# Patient Record
Sex: FEMALE | Race: WHITE | NOT HISPANIC OR LATINO
[De-identification: names, ages, dates, MRNs, and addresses within clinical notes are randomized per-mention and may not be internally consistent; named-entity substitution may affect disease eponyms.]

---

## 2018-10-31 PROBLEM — Z00.00 ENCOUNTER FOR PREVENTIVE HEALTH EXAMINATION: Status: ACTIVE | Noted: 2018-10-31

## 2019-02-09 ENCOUNTER — RX CHANGE (OUTPATIENT)
Age: 64
End: 2019-02-09

## 2019-02-27 ENCOUNTER — RECORD ABSTRACTING (OUTPATIENT)
Age: 64
End: 2019-02-27

## 2019-02-27 DIAGNOSIS — Z87.19 PERSONAL HISTORY OF OTHER DISEASES OF THE DIGESTIVE SYSTEM: ICD-10-CM

## 2019-02-27 DIAGNOSIS — Z82.0 FAMILY HISTORY OF EPILEPSY AND OTHER DISEASES OF THE NERVOUS SYSTEM: ICD-10-CM

## 2019-02-27 DIAGNOSIS — Z82.3 FAMILY HISTORY OF STROKE: ICD-10-CM

## 2019-02-27 DIAGNOSIS — Z82.49 FAMILY HISTORY OF ISCHEMIC HEART DISEASE AND OTHER DISEASES OF THE CIRCULATORY SYSTEM: ICD-10-CM

## 2019-02-27 DIAGNOSIS — Z78.9 OTHER SPECIFIED HEALTH STATUS: ICD-10-CM

## 2019-02-27 RX ORDER — LANSOPRAZOLE 15 MG/1
15 CAPSULE, DELAYED RELEASE ORAL
Refills: 0 | Status: ACTIVE | COMMUNITY

## 2019-02-27 RX ORDER — MULTIVITAMIN
CAPSULE ORAL
Refills: 0 | Status: ACTIVE | COMMUNITY

## 2019-03-15 ENCOUNTER — RESULT REVIEW (OUTPATIENT)
Age: 64
End: 2019-03-15

## 2019-03-19 ENCOUNTER — APPOINTMENT (OUTPATIENT)
Dept: ENDOCRINOLOGY | Facility: CLINIC | Age: 64
End: 2019-03-19
Payer: COMMERCIAL

## 2019-03-19 VITALS
SYSTOLIC BLOOD PRESSURE: 120 MMHG | DIASTOLIC BLOOD PRESSURE: 78 MMHG | HEART RATE: 66 BPM | HEIGHT: 68 IN | WEIGHT: 161 LBS | BODY MASS INDEX: 24.4 KG/M2

## 2019-03-19 PROCEDURE — 99213 OFFICE O/P EST LOW 20 MIN: CPT

## 2019-03-19 NOTE — PHYSICAL EXAM
[Alert] : alert [No Acute Distress] : no acute distress [Well Nourished] : well nourished [Well Developed] : well developed [EOMI] : extra ocular movement intact [Normal Outer Ear/Nose] : the ears and nose were normal in appearance [No Neck Mass] : no neck mass was observed [Supple] : the neck was supple [Thyroid Not Enlarged] : the thyroid was not enlarged [No Stigmata of Cushings Syndrome] : no stigmata of cushings syndrome

## 2019-03-19 NOTE — HISTORY OF PRESENT ILLNESS
[FreeTextEntry1] : March 19, 2019..........................ba\par \par PCP: Dr. Milagro Younger\par             Neuro: Dr. Rosemary Lopez - shooting leg pain - resolved\par             Ortho Dr Koch for the knee pain - \par             better on gluten free diet\par             Card: Dr. Nisha Tong - palpitations/FHx ASHD\par             Rheum: Dr. Karla Wong - knee pain - emperic Rx of arthritis\par             Surgery: Dr. Fredy Maher\par             ENT: to see for nasal polyp\par            .\par            CC: Thyroid cancer, R lobe ~1 cm - 2/23/15 - Dr. Mhaer \par             " Encapsulated papillary thyroid carcinoma, classical type with oncocytic features, non-invasive (1 cm in greatest size per outside report). 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     Has friend at [facility].\par Went to [facility] for thyroglobulin, but Tg Ab and TPO ab performed.  \par US says new nodules and repeat.\par Will repeat US end of August-early Sept and ROV after that.    Tg now and before next visit at lab she previously visited.  \par \par Previous notes from [employer] appended below.\par \par \par  March 26, 2018\par            .\par            PCP: Dr. Milagro Younger\par             Neuro: Dr. Rosemary Lopez - shooting leg pain - resolved\par             Ortho Dr Koch for the knee pain - \par             better on gluten free diet\par             Card: Dr. Nisha Tong - palpitations/FHx ASHD\par             Rheum: Dr. Karla Wong - knee pain - emperic Rx of arthritis\par             Surgery: Dr. Fredy Maher\par             ENT: to see for nasal polyp\par            .\par            CC: Thyroid cancer, R lobe ~1 cm - 2/23/15 - Dr. Maher \par             " Encapsulated papillary thyroid carcinoma, classical type with oncocytic features, non-invasive (1 cm in greatest size per outside report). 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, found to have thyroid nodule during carotid ultrasound - currently on on wheat free diet per her\par            brother (GP) - with success.\par            .\par            Last year in February had ultrasound of neck.\par            .\par            At [facility], on \par            3/28/2017 labs included]\par            TPO ab neg\par            Tg 9.2 *\par            TSH 1.46\par            \par            HDL 62 \par            .\par            [id number]\par            Tg 10.9\par            Tg Ab neg \par            TSH 1.16 \par            .\par            Updated ultrasound of thyroid at [facility]:\par            .\par            "Exam Date: 03/14/18 \par            Exam: US THYROID \par            Order#: [id number] \par            CLINICAL HISTORY: Papillary carcinoma. Follow-up. \par             Thyroid Ultrasound \par             Real-time sonographic examination was performed on 3/14/2018 and compared to a previous study \par            dated 2/16/2017. Patient is status post right thyroidectomy. A 0.8 x 0.4 x 0.6 cm echogenic nodule \par            is again appreciated in the right thyroid fossa and is grossly unchanged. The isthmus measures 0.3 \par            cm in AP dimension. The left thyroid lobe measures 4.9 x 1.7 x 1.5 cm. It contains multiple cysts \par            as well as several nodules, the most prominent of which are as follows: \par             0.8 x 0.6 x 0.9 cm hypoechoic nodule upper pole-grossly unchanged \par             0.6 x 0.5 x 0.8 cm heterogeneous nodule mid gland-grossly unchanged \par             0.9 x 0.6 x 0.7 cm hypoechoic nodule inferior pole-grossly unchanged \par             IMPRESSION: Status post right thyroidectomy. No significant change in small echogenic nodule \par            within the right thyroid fossa. \par             No gross change in several left thyroid nodules. \par            ***Electronically Signed *** \par            ----------------------------------------------- \par            Mauricio Hammer MD " \par            .\par            Impression: She is very good about following up for lab tests, ultrasounds, physician visits. Continued surveillance by means of exam, history, labs and ultrasound is certainly reasonble for now. She prefers annual interval.\par            .\par            Plan: Updated ultrasound neck at [facility] and blood tests at [facility] including thyroglobulin, TSH before next visit early 2019. She will go for updated bone density at [facility] as well. \par            .\par            March 15, 2017\par            .\par            PCP: Dr. Milagro Younger\par             Neuro: Dr. Rosemary Lopez - shooting leg pain - resolved\par             Ortho Dr Koch for the knee pain - \par             better on gluten free diet\par             Card: Dr. Nisha Tong - palpitations/FHx ASHD\par             Rheum: Dr. Karla Wong - knee pain - emperic Rx of arthritis\par             Surgery: Dr. Fredy Maher\par             ENT: to see for nasal polyp\par            .\par            CC: Thyroid cancer, R lobe ~1 cm - 2/23/15 - Dr. Maher \par             " Encapsulated papillary thyroid carcinoma, classical type with oncocytic features, non-invasive (1 cm in greatest size per outside report). 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 Younger on\par            3/7/2017 included:\par            TSH 1.46 (on no LT4 Rx)\par            free T4 1.1 (0.8-1.9) \par            .\par            Most recent ultrasound of thyroid at [facility]:.\par            .\par            "Patient Name: MISTI SARGENT Location: [facility] \par            Med Rec #: [id number] Account #: [id number] \par            YOB: 1955 Ordering: Hellerman, James MD \par            Age: 61 Sex: F Attending: Fredy Maher MD \par            PCP: NO PRIMARY CARE PHYSICIAN \par            ______________________________________________________________________________________ \par            Exam Date: 02/16/17 \par            Exam: US THYROID \par            Order#: [id number] \par            HISTORY: Status post right thyroidectomy for papillary carcinoma, follow-up \par             Real-time examination was performed. Comparison is made to prior sonogram of 8/18/2016. \par             The patient is status post right thyroid lobectomy. There has been no gross change in a solid \par            nodule in the right thyroid bed that measures 8 x 3.9 x 5.6 mm. \par             The left thyroid lobe measures 4.9 x 1.7 x 1.5 cm. There has been no change in diffuse \par            heterogeneity of left lobe parenchymal echogenicity. There has been no change in a 9 x 6.1 x 8.2 mm \par            predominantly solid nodule in the left upper pole or in a 7.4 x 4 x 6 mm predominantly solid nodule \par            in the anterior mid pole or in several predominantly cystic lesions in the left mid and lower pole. \par             IMPRESSION: No significant change from prior sonogram of 8/18/2016, as outlined above - read by Dr. Kent\par            "\par            Impression: History of thyroid cancer ("NIFTP") s/p hemithyroidectomy, remains under surveillance by means of TFTs, ultrasound and will also update her thyroglobulin level.\par            .\par            ROV 8 months. Thank you. \par            .\par            ==\par            .\par            August 23, 2016\par            .\par            PCP: Dr. Milagro Younger\par             Neuro: Dr. Rosemary Lopez - shooting leg pain - resolved\par             Ortho Dr Koch for the knee pain - \par             better on gluten free diet\par             Card: Dr. Nisha Tong - palpitations/FHx ASHD\par             Rheum: Dr. Karla Wong - knee pain - emperic Rx of arthritis\par             Surgery: Dr. Fredy Maher\par             ENT: to see for nasal polyp\par            .\par            CC: Thyroid cancer, R lobe ~1 cm - 2/23/15 - Dr. Maher \par            .\par            Recent labs ([facility])\par            8/16/2016 included\par            thyroglobulin 8.9 \par            thyroglobulin antibodies: neg\par            TSH 1.73 (w/o Rx) \par            .\par            Ultrasound of thyroid at [facility] on\par            8/18/2016 as read by Dr. Bryant:\par            .\par            "CLINICAL HISTORY: Status post right thyroidectomy for papillary carcinoma\par            COMPARISON: 12/26/2014, 1/16/2015, and 8/13/2015\par            FINDINGS: As compared to most recent previous study the patient again is status post right thyroidectomy. However at this time a hypoechoic residual is seen in the right thyroid fossa measuring 7.5 x 5.8 x 3.4 mm. This may represent residual or recurrent abnormality.\par            The left lobe of the thyroid measures 5.1 x 1.8 x 2 cm and is diffusely heterogeneous and nodular including smaller cystic areas. A spongiform nodule is seen in the upper pole measuring 8.6 x 8.1 x 6 mm. A midpole anterior spongiform nodule measures 6 x 4.3 x 7.4 mm. These were present previously. The remaining abnormalities are consistent with multifocal colloid cysts.\par            Residual portion of the isthmus is seen to the left of midline measuring 1.5 mm.\par            IMPRESSION: Right thyroidectomy. A hypoechoic nodule is now seen in the thyroid bed that may represent residual or recurrent mass. Recommend further assessment.\par            No change in the left side with nodules and cysts again apparent."\par            .\par            Impression: Doing well. \par            .\par            Plan: Continued surveillance.\par            Updated labs and ultrasound in six months and ROV after that. \par            Thank you. -jh\par            .\par            .==\par            .\par            February 16, 2016\par            .\par            PCP: Dr. Milagro Younger\par             Neuro: Dr. Rosemary Lopez - shooting leg pain - resolved\par             after taking Tramadol\par             Ortho Dr Koch for the knee pain - gave up wheat\par             celiac neg\par             Card: Dr. Nisha Tong - palpitations/FHx ASHD\par             Rheum: Dr. Karla Wong - knee pain - emperic Rx of arthritis\par             Surgery: Dr. Fredy Maher\par             ENT: to see for nasal polyp\par            .\par            CC: Thyroid cancer, R lobe ~1 cm - 2/23/15 - Dr. Maher \par            .\par            Dr. Younger did blood tests 12/9/15\par            TSH 1.4\par            .\par            Sono August 2015 OK..\par            .\par            Plan: ROV late August. \par            .\par            .\par            .\par            ==\par            .\par            August 14, 2015\par            .\par            PCP: Dr. Milagro Younger\par             Neuro: Dr. Rosemary Lopez - shooting leg pain - resolved\par             Ortho Dr Koch for the knee pain - \par             Card: Dr. Nisha Tong - palpitations/FHx ASHD\par             Rheum: Dr. Karla Wong - knee pain - emperic Rx of arthritis\par             Surgery: Dr. Fredy Maher\par            .\par            CC: Thyroid cancer, R lobe ~1 cm - 2/23/15 - Dr. Maher \par            .\par            Note from April visit appended below.\par            Labs done at that time included\par            Tg Ab negative\par            thyroglobulin 14\par            T4 6.4\par            TPO ab neg\par            TSH 1.43.\par            .\par            Impression: Feeling well. Sono thyroid yesterday showed no nodules in remaining lobe. Patient realizes that this does NOT exclude the possibility of micro carcinoma in the remaining lobe. However, she prefers continued surveillance.\par            .\par            Plan: Updated lab; f/u ultrasound one year. -\par            .\par            ==\par            .\par            .\par            ==\par            .\par            April 3, 2015\par            .\par            .\par            Note from February appended below.\par            .\par            Dec 2014 ultrasound of thyroid at [facility]:\par            .\par            "CLINICAL HISTORY: Thyroid nodule\par            FINDINGS: Thyroid isthmus measures 2.8 mm. Midline slightly to the left contains a hypoechoic nodule relatively defined and measuring 1.4 x 1.1 x 0.8 cm. A similar nodule to the right of midline measures 0.85 x 0.81 x 0.63 cm.\par            Right lobe of the thyroid diffusely heterogeneous and cystic and nodular measures 4.9 x 1.5 x 1.7 cm. The largest somewhat spongiform-appearing nodule seen at the lower pole measures 1.2 x 0.8 x 0.6 cm in at the upper pole measures 1.1 x 1.1 x 0.54 cm.\par            The left lobe of the thyroid measures 5 x 1.4 x 1.3 cm diffusely heterogeneous nodular and cystic. The largest nodule at the midpole measures 0.87 x 0.83 x 0.48 cm.\par            IMPRESSION: Multifocal nodular and cystic heterogeneous thyroid gland. Largest nodule is seen in the isthmus central slightly to the left. A comparison to prior studies is suggested. If not available sonogram guided needle aspiration biopsy of the dominant isthmus mass is suggested. Additional one-year interval follow-up of the remaining findings is suggested.\par            .\par            .\par            Jan 16, 2015: FNAB at [facility]: "suspicious for papillary thyroid cancer."\par            ..\par            .\par            .\par            Feb 23, 2015 underwent R lobe and thyroid isthmus excision at [facility] by Dr. Fredy Maher.\par            Pathology reviewed at [facility] by Dr. Nolan: "The FNA is positive for papillary carcinoma. The slides show a background of nodular hyperplasia. Slide A5 shows an encapsulated follicular and papillary proliferation lined by cells having enlarged, irregular, overlapping nuclei with variable degree of nuclear clearing and rare grooves. The lesion is non-invasive. I believe we are dealing with a papillary carcinoma. The mild nuclear clearing may be related to the oncocyteic nature of the lesional cells cytoplasm. I agree with you that there is a papillary microcarcinoma in slideA7. There is no vascular invasion by this microcarcinoma. In conclusion, by diagnosis is: 1 Encapsulated papillary thyroid carcinoma, classical type with oncocytic features, non-invasive (1 cm in greatest size per outside report). Papillary throid microcarcinoma growing as a follicular variant, infiltrative (< 0.1 cm in greateds size)."\par            .\par            .\par            Impression: Options available include completion left thyroid lobectomy versus regular monitoring. The optimal course is not clear and will need to take into account patient preferences. As long as she is willing to undergo close surveillance by means of exam and ultrasound and possible biopsy, then surveillance is a reasonable option - as is surgery.\par            .\par            Plan: She will think about what she would prefer and let us know.\par            .\par            .\par            ====\par            Feb 13, 2014\par            .\par            PCP: Dr. Milagro Younger\par             Neuro: Dr. Rosemary Lopez - shooting leg pain - resolved\par             Ortho Dr Koch for the knee pain - \par             Card: Dr. Nisha Tong - palpitations/FHx ASHD\par             Rheum: Dr. Karla Wong - knee pain - emeric Rx of arthritis\par             Surgery: Dr. Fredy Maher\par            .\par            Labs Dec 19 at [facility] show TSH 0.32 (0.35-5.6)\par            Phos 4.8\par            calcium \par            TSI < 1.0\par            TPO ab neg.\par            .\par            FNAB of dominant isthmus thyroid nodule: "Suspicious for papillary carcinoma, scant evidence....The follicular cells are present in threee dimensional clusters of enlarged, overlapping cells with nuclear atypia including nuclear grooves and reare intranuclear pseudoinclusions....."\par            .\par            Impression: Surgical excision is appropriate for definitive diagnosis and she has made arrangements for such by the end of the month.\par            I encouraged her to return here about one month later. \par            .\par            ==\par            Dec 19, 2014\par            PCP: Dr. Milagro Younger\par             Neuro: Dr. Rosemary Lopez\par             Card: Dr. Nisha Tong\par             Rheum: Dr. 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 A formal thyroid ultrasound read by Dr. Umesh Fuller from Oct 1 showed R thyroid lobe 3.8 cm. The left thyroid lobe could not be imaged. "The thyroid gland is apparently nearly replaced by cysts and hypoechoic lesions.....clinical correlation is advised...."\par            .\par            Lab tests showed normal TSH of 0.4 .\par            .\par            Plan: Updated labs and sonogram thyroid at [facility].\par            ROV after that.\par            .\par            .\par            ==\par            .

## 2019-09-02 DIAGNOSIS — K21.9 GASTRO-ESOPHAGEAL REFLUX DISEASE W/OUT ESOPHAGITIS: ICD-10-CM

## 2019-09-02 DIAGNOSIS — M81.0 AGE-RELATED OSTEOPOROSIS W/OUT CURRENT PATHOLOGICAL FRACTURE: ICD-10-CM

## 2019-09-04 ENCOUNTER — APPOINTMENT (OUTPATIENT)
Dept: CARDIOLOGY | Facility: CLINIC | Age: 64
End: 2019-09-04
Payer: MEDICARE

## 2019-09-04 ENCOUNTER — NON-APPOINTMENT (OUTPATIENT)
Age: 64
End: 2019-09-04

## 2019-09-04 VITALS
SYSTOLIC BLOOD PRESSURE: 98 MMHG | BODY MASS INDEX: 22.89 KG/M2 | HEIGHT: 72 IN | DIASTOLIC BLOOD PRESSURE: 68 MMHG | WEIGHT: 169 LBS | HEART RATE: 66 BPM

## 2019-09-04 PROCEDURE — 93000 ELECTROCARDIOGRAM COMPLETE: CPT

## 2019-09-04 PROCEDURE — 99214 OFFICE O/P EST MOD 30 MIN: CPT

## 2019-09-04 NOTE — HISTORY OF PRESENT ILLNESS
[FreeTextEntry1] : Ms Izquierdo has been followed here since  2002 for palpitations. She was found to have symptomatic sinus tachycardia and VPCs.\par Since last visit she has not been hospitalized, she had a UTI and the flu.\par she is selling her house, active in daily life and walks at times but doesn't do formal exercise. She denies chest pain, dyspnea, palpitations or syncope.\par

## 2019-09-16 ENCOUNTER — RESULT REVIEW (OUTPATIENT)
Age: 64
End: 2019-09-16

## 2019-10-02 ENCOUNTER — APPOINTMENT (OUTPATIENT)
Dept: ENDOCRINOLOGY | Facility: CLINIC | Age: 64
End: 2019-10-02
Payer: MEDICARE

## 2019-10-02 VITALS
HEART RATE: 64 BPM | DIASTOLIC BLOOD PRESSURE: 80 MMHG | HEIGHT: 68 IN | WEIGHT: 164 LBS | BODY MASS INDEX: 24.86 KG/M2 | SYSTOLIC BLOOD PRESSURE: 120 MMHG

## 2019-10-02 PROCEDURE — 99214 OFFICE O/P EST MOD 30 MIN: CPT

## 2019-10-03 NOTE — HISTORY OF PRESENT ILLNESS
[FreeTextEntry1] : Oct 02, 2019\par \par PCP: Dr. Milagro Younger\par             Neuro: Dr. Rosemary Lopez - shooting leg pain - resolved\par             Ortho Dr Koch for the knee pain -     better on gluten free diet\par             Card: Dr. Nisha Tong - palpitations/FHx ASHD\par             Rheum: Dr. Karla Wong - knee pain - emperic Rx of arthritis\par             Surgery: Dr. Fredy Maher\par             ENT: to see for nasal polyp\par             Urol:  Dr. Epps (C-P) - Stevia bad.\par             Gyn:  Dr. Nimo Marcos\par \par            .\par            CC: Thyroid cancer, R lobe ~1 cm - 2/23/15 - Dr. Maher \par             " Encapsulated papillary thyroid carcinoma, classical type with oncocytic features, non-invasive (1 cm in greatest size per outside report). 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  Left lobe 5.2 cm with multiple nodules and an "apparent increase in size of 2 of these nodules.  Follow up study in 6 months time to ensure stability is recommended.  If any increase of these nodules is appreciated, aspiration biopsy to establish histologic diagnosis would be necessary..." as read by Dr. Mauricio Hammer.\par \par Plan:  Updated labs at [facility] and Ultrasound thyroid at [facility] in late March and ROV here in April.  \par \par Plan:  Updated US and labs in 6 months as advised by radiology.\par ROV after that.  \par \par \par \par \par \par \par March 19, 2019..........................ba\par \par PCP: Dr. Milagro Younger\par             Neuro: Dr. Rosemary Lopez - shooting leg pain - resolved\par             Ortho Dr Koch for the knee pain - \par             better on gluten free diet\par             Card: Dr. Nisha Tong - palpitations/FHx ASHD\par             Rheum: Dr. Karla Wong - knee pain - emperic Rx of arthritis\par             Surgery: Dr. Fredy Maher\par             ENT: to see for nasal polyp\par            .\par            CC: Thyroid cancer, R lobe ~1 cm - 2/23/15 - Dr. Maher \par             " Encapsulated papillary thyroid carcinoma, classical type with oncocytic features, non-invasive (1 cm in greatest size per outside report). 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     Has friend at [facility].\par Went to [facility] for thyroglobulin, but Tg Ab and TPO ab performed.  \par US says new nodules and repeat.\par Will repeat US end of August-early Sept and ROV after that.    and before next visit at lab she previously visited.  \par \par Previous notes from [employer] appended below.\par \par \par  March 26, 2018\par            .\par            PCP: Dr. Milagro Younger\par             Neuro: Dr. Rosemary Lopez - shooting leg pain - resolved\par             Ortho Dr Koch for the knee pain - \par             better on gluten free diet\par             Card: Dr. Nisha Tong - palpitations/FHx ASHD\par             Rheum: Dr. Karla Wong - knee pain - emperic Rx of arthritis\par             Surgery: Dr. Fredy Maher\par             ENT: to see for nasal polyp\par            .\par            CC: Thyroid cancer, R lobe ~1 cm - 2/23/15 - Dr. Maher \par             " Encapsulated papillary thyroid carcinoma, classical type with oncocytic features, non-invasive (1 cm in greatest size per outside report). 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, found to have thyroid nodule during carotid ultrasound - currently on on wheat free diet per her\par            brother (GP) - with success.\par            .\par            Last year in February had ultrasound of neck.\par            .\par            At [facility], on \par            3/28/2017 labs included]\par            TPO ab neg\par            Tg 9.2 *\par            TSH 1.46\par            \par            HDL 62 \par            .\par            77529-51\par            Tg 10.9\par            Tg Ab neg \par            TSH 1.16 \par            .\par            Updated ultrasound of thyroid at [facility]:\par            .\par            "Exam Date: 03/14/18 \par            Exam: US THYROID \par            Order#: [id number] \par            CLINICAL HISTORY: Papillary carcinoma. Follow-up. \par             Thyroid Ultrasound \par             Real-time sonographic examination was performed on 3/14/2018 and compared to a previous study \par            dated 2/16/2017. Patient is status post right thyroidectomy. A 0.8 x 0.4 x 0.6 cm echogenic nodule \par            is again appreciated in the right thyroid fossa and is grossly unchanged. The isthmus measures 0.3 \par            cm in AP dimension. The left thyroid lobe measures 4.9 x 1.7 x 1.5 cm. It contains multiple cysts \par            as well as several nodules, the most prominent of which are as follows: \par             0.8 x 0.6 x 0.9 cm hypoechoic nodule upper pole-grossly unchanged \par             0.6 x 0.5 x 0.8 cm heterogeneous nodule mid gland-grossly unchanged \par             0.9 x 0.6 x 0.7 cm hypoechoic nodule inferior pole-grossly unchanged \par             IMPRESSION: Status post right thyroidectomy. No significant change in small echogenic nodule \par            within the right thyroid fossa. \par             No gross change in several left thyroid nodules. \par            ***Electronically Signed *** \par            ----------------------------------------------- \par            Mauricio Hammer MD " \par            .\par            Impression: She is very good about following up for lab tests, ultrasounds, physician visits. Continued surveillance by means of exam, history, labs and ultrasound is certainly reasonble for now. She prefers annual interval.\par            .\par            Plan: Updated ultrasound neck at [facility] and blood tests at [facility] including thyroglobulin, TSH before next visit early 2019. She will go for updated bone density at [facility] as well. \par            .\par            March 15, 2017\par            .\par            PCP: Dr. Milagro Younger\par             Neuro: Dr. Rosemary Lopez - shooting leg pain - resolved\par             Ortho Dr Koch for the knee pain - \par             better on gluten free diet\par             Card: Dr. Nisha Tong - palpitations/FHx ASHD\par             Rheum: Dr. Karla Wong - knee pain - emperic Rx of arthritis\par             Surgery: Dr. Fredy aMher\par             ENT: to see for nasal polyp\par            .\par            CC: Thyroid cancer, R lobe ~1 cm - 2/23/15 - Dr. Maher \par             " Encapsulated papillary thyroid carcinoma, classical type with oncocytic features, non-invasive (1 cm in greatest size per outside report). 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 Younger on\par            3/7/2017 included:\par            TSH 1.46 (on no LT4 Rx)\par            free T4 1.1 (0.8-1.9) \par            .\par            Most recent ultrasound of thyroid at [facility]:.\par            .\par            "Patient Name: MISTI SARGENT Location: hospitals \par            Med Rec #: [id number] Account #: [id number] \par            YOB: 1955 Ordering: Hellerman, James MD \par            Age: 61 Sex: F Attending: Fredy Maher MD \par            PCP: NO PRIMARY CARE PHYSICIAN \par            ______________________________________________________________________________________ \par            Exam Date: 02/16/17 \par            Exam: US THYROID \par            Order#: [id number] \par            HISTORY: Status post right thyroidectomy for papillary carcinoma, follow-up \par             Real-time examination was performed. Comparison is made to prior sonogram of 8/18/2016. \par             The patient is status post right thyroid lobectomy. There has been no gross change in a solid \par            nodule in the right thyroid bed that measures 8 x 3.9 x 5.6 mm. \par             The left thyroid lobe measures 4.9 x 1.7 x 1.5 cm. There has been no change in diffuse \par            heterogeneity of left lobe parenchymal echogenicity. There has been no change in a 9 x 6.1 x 8.2 mm \par            predominantly solid nodule in the left upper pole or in a 7.4 x 4 x 6 mm predominantly solid nodule \par            in the anterior mid pole or in several predominantly cystic lesions in the left mid and lower pole. \par             IMPRESSION: No significant change from prior sonogram of 8/18/2016, as outlined above - read by Dr. Kent\par            "\par            Impression: History of thyroid cancer ("NIFTP") s/p hemithyroidectomy, remains under surveillance by means of TFTs, ultrasound and will also update her thyroglobulin level.\par            .\par            ROV 8 months. Thank you. \par            .\par            ==\par            .\par            August 23, 2016\par            .\par            PCP: Dr. Milagro Younger\par             Neuro: Dr. Rosemary Lopez - shooting leg pain - resolved\par             Ortho Dr Koch for the knee pain - \par             better on gluten free diet\par             Card: Dr. Nisha Tong - palpitations/FHx ASHD\par             Rheum: Dr. Karla Wong - knee pain - emperic Rx of arthritis\par             Surgery: Dr. Fredy Maher\par             ENT: to see for nasal polyp\par            .\par            CC: Thyroid cancer, R lobe ~1 cm - 2/23/15 - Dr. Maher \par            .\par            Recent labs ([facility])\par            8/16/2016 included\par            thyroglobulin 8.9 \par            thyroglobulin antibodies: neg\par            TSH 1.73 (w/o Rx) \par            .\par            Ultrasound of thyroid at [facility] on\par            8/18/2016 as read by Dr. Bryant:\par            .\par            "CLINICAL HISTORY: Status post right thyroidectomy for papillary carcinoma\par            COMPARISON: 12/26/2014, 1/16/2015, and 8/13/2015\par            FINDINGS: As compared to most recent previous study the patient again is status post right thyroidectomy. However at this time a hypoechoic residual is seen in the right thyroid fossa measuring 7.5 x 5.8 x 3.4 mm. This may represent residual or recurrent abnormality.\par            The left lobe of the thyroid measures 5.1 x 1.8 x 2 cm and is diffusely heterogeneous and nodular including smaller cystic areas. A spongiform nodule is seen in the upper pole measuring 8.6 x 8.1 x 6 mm. A midpole anterior spongiform nodule measures 6 x 4.3 x 7.4 mm. These were present previously. The remaining abnormalities are consistent with multifocal colloid cysts.\par            Residual portion of the isthmus is seen to the left of midline measuring 1.5 mm.\par            IMPRESSION: Right thyroidectomy. A hypoechoic nodule is now seen in the thyroid bed that may represent residual or recurrent mass. Recommend further assessment.\par            No change in the left side with nodules and cysts again apparent."\par            .\par            Impression: Doing well. \par            .\par            Plan: Continued surveillance.\par            Updated labs and ultrasound in six months and ROV after that. \par            Thank you. -jh\par            .\par            .==\par            .\par            February 16, 2016\par            .\par            PCP: Dr. Milagro Younger\par             Neuro: Dr. Rosemary Lopez - shooting leg pain - resolved\par             after taking Tramadol\par             Ortho Dr Koch for the knee pain - gave up wheat\par             celiac neg\par             Card: Dr. Nisha Tong - palpitations/FHx ASHD\par             Rheum: Dr. Karla Wong - knee pain - emperic Rx of arthritis\par             Surgery: Dr. Fredy Maher\par             ENT: to see for nasal polyp\par            .\par            CC: Thyroid cancer, R lobe ~1 cm - 2/23/15 - Dr. Maher \par            .\par            Dr. Younger did blood tests 12/9/15\par            TSH 1.4\par            .\par            Sono August 2015 OK..\par            .\par            Plan: ROV late August. \par            .\par            .\par            .\par            ==\par            .\par            August 14, 2015\par            .\par            PCP: Dr. Milagro Younger\par             Neuro: Dr. Rosemary Lopez - shooting leg pain - resolved\par             Ortho Dr Koch for the knee pain - \par             Card: Dr. Nisha Tong - palpitations/FHx ASHD\par             Rheum: Dr. Karla Wong - knee pain - emperic Rx of arthritis\par             Surgery: Dr. Fredy Maher\par            .\par            CC: Thyroid cancer, R lobe ~1 cm - 2/23/15 - Dr. Maher \par            .\par            Note from April visit appended below.\par            Labs done at that time included\par            Tg Ab negative\par            thyroglobulin 14\par            T4 6.4\par            TPO ab neg\par            TSH 1.43.\par            .\par            Impression: Feeling well. Sono thyroid yesterday showed no nodules in remaining lobe. Patient realizes that this does NOT exclude the possibility of micro carcinoma in the remaining lobe. However, she prefers continued surveillance.\par            .\par            Plan: Updated lab; f/u ultrasound one year. -\par            .\par            ==\par            .\par            .\par            ==\par            .\par            April 3, 2015\par            .\par            .\par            Note from February appended below.\par            .\par            Dec 2014 ultrasound of thyroid at [facility]:\par            .\par            "CLINICAL HISTORY: Thyroid nodule\par            FINDINGS: Thyroid isthmus measures 2.8 mm. Midline slightly to the left contains a hypoechoic nodule relatively defined and measuring 1.4 x 1.1 x 0.8 cm. A similar nodule to the right of midline measures 0.85 x 0.81 x 0.63 cm.\par            Right lobe of the thyroid diffusely heterogeneous and cystic and nodular measures 4.9 x 1.5 x 1.7 cm. The largest somewhat spongiform-appearing nodule seen at the lower pole measures 1.2 x 0.8 x 0.6 cm in at the upper pole measures 1.1 x 1.1 x 0.54 cm.\par            The left lobe of the thyroid measures 5 x 1.4 x 1.3 cm diffusely heterogeneous nodular and cystic. The largest nodule at the midpole measures 0.87 x 0.83 x 0.48 cm.\par            IMPRESSION: Multifocal nodular and cystic heterogeneous thyroid gland. Largest nodule is seen in the isthmus central slightly to the left. A comparison to prior studies is suggested. If not available sonogram guided needle aspiration biopsy of the dominant isthmus mass is suggested. Additional one-year interval follow-up of the remaining findings is suggested.\par            .\par            .\par            Jan 16, 2015: FNAB at [facility]: "suspicious for papillary thyroid cancer."\par            ..\par            .\par            .\par            Feb 23, 2015 underwent R lobe and thyroid isthmus excision at [facility] by Dr. Fredy Maher.\par            Pathology reviewed at [facility] by [place]. Nolan: "The FNA is positive for papillary carcinoma. The slides show a background of nodular hyperplasia. Slide A5 shows an encapsulated follicular and papillary proliferation lined by cells having enlarged, irregular, overlapping nuclei with variable degree of nuclear clearing and rare grooves. The lesion is non-invasive. I believe we are dealing with a papillary carcinoma. The mild nuclear clearing may be related to the oncocyteic nature of the lesional cells cytoplasm. I agree with you that there is a papillary microcarcinoma in slideA7. There is no vascular invasion by this microcarcinoma. In conclusion, by diagnosis is: 1 Encapsulated papillary thyroid carcinoma, classical type with oncocytic features, non-invasive (1 cm in greatest size per outside report). Papillary throid microcarcinoma growing as a follicular variant, infiltrative (< 0.1 cm in greateds size)."\par            .\par            .\par            Impression: Options available include completion left thyroid lobectomy versus regular monitoring. The optimal course is not clear and will need to take into account patient preferences. As long as she is willing to undergo close surveillance by means of exam and ultrasound and possible biopsy, then surveillance is a reasonable option - as is surgery.\par            .\par            Plan: She will think about what she would prefer and let us know.\par            .\par            .\par            ====\par            Feb 13, 2014\par            .\par            PCP: Dr. Milagro Younger\par             Neuro: Dr. Rosemary Lopez - shooting leg pain - resolved\par             Ortho Dr Koch for the knee pain - \par             Card: Dr. Nisha Tong - palpitations/FHx ASHD\par             Rheum: Dr. Karla Wong - knee pain - emeric Rx of arthritis\par             Surgery: Dr. Fredy Maher\par            .\par            Labs Dec 19 at [facility] show TSH 0.32 (0.35-5.6)\par            Phos 4.8\par            calcium \par            TSI < 1.0\par            TPO ab neg.\par            .\par            FNAB of dominant isthmus thyroid nodule: "Suspicious for papillary carcinoma, scant evidence....The follicular cells are present in threee dimensional clusters of enlarged, overlapping cells with nuclear atypia including nuclear grooves and reare intranuclear pseudoinclusions....."\par            .\par            Impression: Surgical excision is appropriate for definitive diagnosis and she has made arrangements for such by the end of the month.\par            I encouraged her to return here about one month later. \par            .\par            ==\par            Dec 19, 2014\par            PCP: Dr. Milagro Younger\par             Neuro: Dr. Rosemary Lopez\par             Card: Dr. Nisha Tong\par             Rheum: Dr. 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 A formal thyroid ultrasound read by Dr. Umesh Fuller from Oct 1 showed R thyroid lobe 3.8 cm. The left thyroid lobe could not be imaged. "The thyroid gland is apparently nearly replaced by cysts and hypoechoic lesions.....clinical correlation is advised...."\par            .\par            Lab tests showed normal TSH of 0.4 .\par            .\par            Plan: Updated labs and sonogram thyroid at [facility].\par            ROV after that.\par            .\par            .\par            ==\par            .

## 2019-10-03 NOTE — ASSESSMENT
[FreeTextEntry1] : ~\par History of thyroid cancer- underwent hemithyroidectomy.\par Small nodules remaining lobe under surveillance

## 2020-09-22 DIAGNOSIS — D64.9 ANEMIA, UNSPECIFIED: ICD-10-CM

## 2020-10-05 ENCOUNTER — RESULT REVIEW (OUTPATIENT)
Age: 65
End: 2020-10-05

## 2020-10-07 ENCOUNTER — APPOINTMENT (OUTPATIENT)
Dept: ENDOCRINOLOGY | Facility: CLINIC | Age: 65
End: 2020-10-07
Payer: MEDICARE

## 2020-10-07 VITALS
DIASTOLIC BLOOD PRESSURE: 80 MMHG | HEART RATE: 65 BPM | HEIGHT: 68 IN | SYSTOLIC BLOOD PRESSURE: 135 MMHG | WEIGHT: 180 LBS | BODY MASS INDEX: 27.28 KG/M2 | OXYGEN SATURATION: 98 %

## 2020-10-07 PROCEDURE — 99214 OFFICE O/P EST MOD 30 MIN: CPT

## 2020-10-13 NOTE — ASSESSMENT
[FreeTextEntry1] : NIFTP; nodules remaining thyroid lobe\par continued surveillance by means of symptoms, exam, US, labs.

## 2020-10-13 NOTE — HISTORY OF PRESENT ILLNESS
[FreeTextEntry1] : Oct 07, 2020    in person\par \par PCP: Dr. Milagro Younger\par             Neuro: Dr. Rosmeary Lopez - shooting leg pain - resolved\par             Ortho Dr Koch for the knee pain -     better on gluten free diet\par             Card: Dr. Nisha Tong - palpitations/FHx ASHD\par             Rheum: Dr. Karla Wong - knee pain - emperic Rx of arthritis\par             Surgery: Dr. Fredy Maher\par             ENT: to see for nasal polyp\par             Urol:  Dr. Epps ([facility]) - Stevia bad.\par             Gyn:  Dr. Nimo Marcos\par \par            .\par            CC: Thyroid cancer, R lobe ~1 cm - 2/23/15 - Dr. Maher \par             " Encapsulated papillary thyroid carcinoma, classical type with oncocytic features, non-invasive (1 cm in greatest size per outside report). 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.\par             (Hx swollen knees, tapped by rheum - apparently non-diagnostic,\par             +FHx RA w/ low level RF) \par \par Retired from [employer], then per meghan for a while.\par  being treated at [facility] for amyloidosis.\par Sold home and living at least part of year in [place].\par \par Examination: \par Constitutional:  Alert, well nourished, healthy appearance, no acute distress \par Eyes:  No proptosis, no lid lag\par Thyroid:\par Pulmonary:  No respiratory distress, no accessory muscle use; normal rate and effort\par Cardiac:  Normal rate\par Vascular: \par Back:  Spine straight\par Endocrine:  No stigmata of Cushing’s Syndrome\par Musculoskeletal:  Normal gait, no involuntary movements\par Neurology:  No tremors\par Affect/Mood/Psych:  Oriented x 3; normal affect, normal insight/judgement, normal mood \par .\par  \par Impression:  Recent US thyroid at [place] reassuring.   Advised next US at [facility].\par Tg at [facility] 8.3.  TSH 1.55  Sept 2019\par \par \par \par Oct 02, 2019\par \par PCP: Dr. Milagro Younger\par             Neuro: Dr. Rosemary Lopez - shooting leg pain - resolved\par             Ortho Dr Koch for the knee pain -     better on gluten free diet\par             Card: Dr. Nisha Tong - palpitations/FHx ASHD\par             Rheum: Dr. Karla Wong - knee pain - emperic Rx of arthritis\par             Surgery: Dr. Fredy Maher\par             ENT: to see for nasal polyp\par             Urol:  Dr. Epps ([facility]) - Stevia bad.\par             Gyn:  Dr. Nimo Marcso\par \par            .\par            CC: Thyroid cancer, R lobe ~1 cm - 2/23/15 - Dr. Maher \par             " Encapsulated papillary thyroid carcinoma, classical type with oncocytic features, non-invasive (1 cm in greatest size per outside report). 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  Left lobe 5.2 cm with multiple nodules and an "apparent increase in size of 2 of these nodules.  Follow up study in 6 months time to ensure stability is recommended.  If any increase of these nodules is appreciated, aspiration biopsy to establish histologic diagnosis would be necessary..." as read by Dr. Mauricio Hammer.\par \par Plan:  Updated labs at [facility] and Ultrasound thyroid at [place] in late March and ROV here in April.  \par \par Plan:  Updated US and labs in 6 months as advised by radiology.\par ROV after that.  \par \par \par \par \par \par \par March 19, 2019..........................ba\par \par PCP: Dr. Milagro Younger\par             Neuro: Dr. Rosemary Lopez - shooting leg pain - resolved\par             Ortho Dr Koch for the knee pain - \par             better on gluten free diet\par             Card: Dr. Nisha Tong - palpitations/FHx ASHD\par             Rheum: Dr. Karla Wong - knee pain - emperic Rx of arthritis\par             Surgery: Dr. Fredy Maher\par             ENT: to see for nasal polyp\par            .\par            CC: Thyroid cancer, R lobe ~1 cm - 2/23/15 - Dr. Maher \par             " Encapsulated papillary thyroid carcinoma, classical type with oncocytic features, non-invasive (1 cm in greatest size per outside report). 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     Has friend at [employer] [facility].\par Went to [place] lab for thyroglobulin, but Tg Ab and TPO ab performed.  \par US says new nodules and repeat.\par Will repeat US end of August-early Sept and ROV after that.    and before next visit at lab she previously visited.  \par \par Previous notes from [employer] appended below.\par \par \par  March 26, 2018\par            .\par            PCP: Dr. Milagro Younger\par             Neuro: Dr. Rosemary Lopez - shooting leg pain - resolved\par             Ortho Dr Koch for the knee pain - \par             better on gluten free diet\par             Card: Dr. Nisha Tong - palpitations/FHx ASHD\par             Rheum: Dr. Karla Wong - knee pain - emperic Rx of arthritis\par             Surgery: Dr. Fredy Maher\par             ENT: to see for nasal polyp\par            .\par            CC: Thyroid cancer, R lobe ~1 cm - 2/23/15 - Dr. Maher \par             " Encapsulated papillary thyroid carcinoma, classical type with oncocytic features, non-invasive (1 cm in greatest size per outside report). 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, found to have thyroid nodule during carotid ultrasound - currently on on wheat free diet per her\par            brother (GP) - with success.\par            .\par            Last year in February had ultrasound of neck.\par            .\par            At [facility], on \par            3/28/2017 labs included]\par            TPO ab neg\par            Tg 9.2 *\par            TSH 1.46\par            \par            HDL 62 \par            .\par            [id number]\par            Tg 10.9\par            Tg Ab neg \par            TSH 1.16 \par            .\par            Updated ultrasound of thyroid at [place]:\par            .\par            "Exam Date: 03/14/18 \par            Exam: US THYROID \par            Order#: [id number] \par            CLINICAL HISTORY: Papillary carcinoma. Follow-up. \par             Thyroid Ultrasound \par             Real-time sonographic examination was performed on 3/14/2018 and compared to a previous study \par            dated 2/16/2017. Patient is status post right thyroidectomy. A 0.8 x 0.4 x 0.6 cm echogenic nodule \par            is again appreciated in the right thyroid fossa and is grossly unchanged. The isthmus measures 0.3 \par            cm in AP dimension. The left thyroid lobe measures 4.9 x 1.7 x 1.5 cm. It contains multiple cysts \par            as well as several nodules, the most prominent of which are as follows: \par             0.8 x 0.6 x 0.9 cm hypoechoic nodule upper pole-grossly unchanged \par             0.6 x 0.5 x 0.8 cm heterogeneous nodule mid gland-grossly unchanged \par             0.9 x 0.6 x 0.7 cm hypoechoic nodule inferior pole-grossly unchanged \par             IMPRESSION: Status post right thyroidectomy. No significant change in small echogenic nodule \par            within the right thyroid fossa. \par             No gross change in several left thyroid nodules. \par            ***Electronically Signed *** \par            ----------------------------------------------- \par            Mauricio Hammer MD " \par            .\par            Impression: She is very good about following up for lab tests, ultrasounds, physician visits. Continued surveillance by means of exam, history, labs and ultrasound is certainly reasonble for now. She prefers annual interval.\par            .\par            Plan: Updated ultrasound neck at [place] and blood tests at [facility] including thyroglobulin, TSH before next visit early 2019. She will go for updated bone density at [place] as well. \par            .\par            March 15, 2017\par            .\par            PCP: Dr. Milagro Younger\par             Neuro: Dr. Rosemary Lopez - shooting leg pain - resolved\par             Ortho Dr Koch for the knee pain - \par             better on gluten free diet\par             Card: Dr. Nisha Tong - palpitations/FHx ASHD\par             Rheum: Dr. Karla Wong - knee pain - emperic Rx of arthritis\par             Surgery: Dr. Fredy Maher\par             ENT: to see for nasal polyp\par            .\par            CC: Thyroid cancer, R lobe ~1 cm - 2/23/15 - Dr. Maher \par             " Encapsulated papillary thyroid carcinoma, classical type with oncocytic features, non-invasive (1 cm in greatest size per outside report). 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 Younger on\par            3/7/2017 included:\par            TSH 1.46 (on no LT4 Rx)\par            free T4 1.1 (0.8-1.9) \par            .\par            Most recent ultrasound of thyroid at [place]:.\par            .\par            "Patient Name: MISTI SARGENT Location: [facility] \par            Med Rec #: [id number] Account #: [id number] \par            YOB: 1955 Ordering: Hellerman, James MD \par            Age: 61 Sex: F Attending: Fredy Maher MD \par            PCP: NO PRIMARY CARE PHYSICIAN \par            ______________________________________________________________________________________ \par            Exam Date: 02/16/17 \par            Exam: US THYROID \par            Order#: [id number] \par            HISTORY: Status post right thyroidectomy for papillary carcinoma, follow-up \par             Real-time examination was performed. Comparison is made to prior sonogram of 8/18/2016. \par             The patient is status post right thyroid lobectomy. There has been no gross change in a solid \par            nodule in the right thyroid bed that measures 8 x 3.9 x 5.6 mm. \par             The left thyroid lobe measures 4.9 x 1.7 x 1.5 cm. There has been no change in diffuse \par            heterogeneity of left lobe parenchymal echogenicity. There has been no change in a 9 x 6.1 x 8.2 mm \par            predominantly solid nodule in the left upper pole or in a 7.4 x 4 x 6 mm predominantly solid nodule \par            in the anterior mid pole or in several predominantly cystic lesions in the left mid and lower pole. \par             IMPRESSION: No significant change from prior sonogram of 8/18/2016, as outlined above - read by Dr. Kent\par            "\par            Impression: History of thyroid cancer ("NIFTP") s/p hemithyroidectomy, remains under surveillance by means of TFTs, ultrasound and will also update her thyroglobulin level.\par            .\par            ROV 8 months. Thank you. \par            .\par            ==\par            .\par            August 23, 2016\par            .\par            PCP: Dr. Milagro Younger\par             Neuro: Dr. Rosemary Lopez - shooting leg pain - resolved\par             Ortho Dr Koch for the knee pain - \par             better on gluten free diet\par             Card: Dr. Nisha Tong - palpitations/FHx ASHD\par             Rheum: Dr. Karla Wong - knee pain - emperic Rx of arthritis\par             Surgery: Dr. Fredy Maher\par             ENT: to see for nasal polyp\par            .\par            CC: Thyroid cancer, R lobe ~1 cm - 2/23/15 - Dr. Maher \par            .\par            Recent labs ([facility])\par            8/16/2016 included\par            thyroglobulin 8.9 \par            thyroglobulin antibodies: neg\par            TSH 1.73 (w/o Rx) \par            .\par            Ultrasound of thyroid at [place] on\par            8/18/2016 as read by Dr. Bryant:\par            .\par            "CLINICAL HISTORY: Status post right thyroidectomy for papillary carcinoma\par            COMPARISON: 12/26/2014, 1/16/2015, and 8/13/2015\par            FINDINGS: As compared to most recent previous study the patient again is status post right thyroidectomy. However at this time a hypoechoic residual is seen in the right thyroid fossa measuring 7.5 x 5.8 x 3.4 mm. This may represent residual or recurrent abnormality.\par            The left lobe of the thyroid measures 5.1 x 1.8 x 2 cm and is diffusely heterogeneous and nodular including smaller cystic areas. A spongiform nodule is seen in the upper pole measuring 8.6 x 8.1 x 6 mm. A midpole anterior spongiform nodule measures 6 x 4.3 x 7.4 mm. These were present previously. The remaining abnormalities are consistent with multifocal colloid cysts.\par            Residual portion of the isthmus is seen to the left of midline measuring 1.5 mm.\par            IMPRESSION: Right thyroidectomy. A hypoechoic nodule is now seen in the thyroid bed that may represent residual or recurrent mass. Recommend further assessment.\par            No change in the left side with nodules and cysts again apparent."\par            .\par            Impression: Doing well. \par            .\par            Plan: Continued surveillance.\par            Updated labs and ultrasound in six months and ROV after that. \par            Thank you. -jh\par            .\par            .==\par            .\par            February 16, 2016\par            .\par            PCP: Dr. Milagro Younger\par             Neuro: Dr. Rosemary Lopez - shooting leg pain - resolved\par             after taking Tramadol\par             Ortho Dr Koch for the knee pain - gave up wheat\par             celiac neg\par             Card: Dr. Nisha Tong - palpitations/FHx ASHD\par             Rheum: Dr. Karla Wong - knee pain - emperic Rx of arthritis\par             Surgery: Dr. Fredy Maher\par             ENT: to see for nasal polyp\par            .\par            CC: Thyroid cancer, R lobe ~1 cm - 2/23/15 - Dr. Maher \par            .\par            Dr. Younger did blood tests 12/9/15\par            TSH 1.4\par            .\par            Sono August 2015 OK..\par            .\par            Plan: ROV late August. \par            .\par            .\par            .\par            ==\par            .\par            August 14, 2015\par            .\par            PCP: Dr. Milagro Younger\par             Neuro: Dr. Rosemary Lopez - shooting leg pain - resolved\par             Ortho Dr Koch for the knee pain - \par             Card: Dr. Nisha Tong - palpitations/FHx ASHD\par             Rheum: Dr. Karla Wong - knee pain - emperic Rx of arthritis\par             Surgery: Dr. Fredy Maher\par            .\par            CC: Thyroid cancer, R lobe ~1 cm - 2/23/15 - Dr. Maher \par            .\par            Note from April visit appended below.\par            Labs done at that time included\par            Tg Ab negative\par            thyroglobulin 14\par            T4 6.4\par            TPO ab neg\par            TSH 1.43.\par            .\par            Impression: Feeling well. Sono thyroid yesterday showed no nodules in remaining lobe. Patient realizes that this does NOT exclude the possibility of micro carcinoma in the remaining lobe. However, she prefers continued surveillance.\par            .\par            Plan: Updated lab; f/u ultrasound one year. -\par            .\par            ==\par            .\par            .\par            ==\par            .\par            April 3, 2015\par            .\par            .\par            Note from February appended below.\par            .\par            Dec 2014 ultrasound of thyroid at [place]:\par            .\par            "CLINICAL HISTORY: Thyroid nodule\par            FINDINGS: Thyroid isthmus measures 2.8 mm. Midline slightly to the left contains a hypoechoic nodule relatively defined and measuring 1.4 x 1.1 x 0.8 cm. A similar nodule to the right of midline measures 0.85 x 0.81 x 0.63 cm.\par            Right lobe of the thyroid diffusely heterogeneous and cystic and nodular measures 4.9 x 1.5 x 1.7 cm. The largest somewhat spongiform-appearing nodule seen at the lower pole measures 1.2 x 0.8 x 0.6 cm in at the upper pole measures 1.1 x 1.1 x 0.54 cm.\par            The left lobe of the thyroid measures 5 x 1.4 x 1.3 cm diffusely heterogeneous nodular and cystic. The largest nodule at the midpole measures 0.87 x 0.83 x 0.48 cm.\par            IMPRESSION: Multifocal nodular and cystic heterogeneous thyroid gland. Largest nodule is seen in the isthmus central slightly to the left. A comparison to prior studies is suggested. If not available sonogram guided needle aspiration biopsy of the dominant isthmus mass is suggested. Additional one-year interval follow-up of the remaining findings is suggested.\par            .\par            .\par            Jan 16, 2015: FNAB at [place]: "suspicious for papillary thyroid cancer."\par            ..\par            .\par            .\par            Feb 23, 2015 underwent R lobe and thyroid isthmus excision at [place] by Dr. Fredy Maher.\par            Pathology reviewed at [facility] by Dr. Nolan: "The FNA is positive for papillary carcinoma. The slides show a background of nodular hyperplasia. Slide A5 shows an encapsulated follicular and papillary proliferation lined by cells having enlarged, irregular, overlapping nuclei with variable degree of nuclear clearing and rare grooves. The lesion is non-invasive. I believe we are dealing with a papillary carcinoma. The mild nuclear clearing may be related to the oncocyteic nature of the lesional cells cytoplasm. I agree with you that there is a papillary microcarcinoma in slideA7. There is no vascular invasion by this microcarcinoma. In conclusion, by diagnosis is: 1 Encapsulated papillary thyroid carcinoma, classical type with oncocytic features, non-invasive (1 cm in greatest size per outside report). Papillary throid microcarcinoma growing as a follicular variant, infiltrative (< 0.1 cm in greateds size)."\par            .\par            .\par            Impression: Options available include completion left thyroid lobectomy versus regular monitoring. The optimal course is not clear and will need to take into account patient preferences. As long as she is willing to undergo close surveillance by means of exam and ultrasound and possible biopsy, then surveillance is a reasonable option - as is surgery.\par            .\par            Plan: She will think about what she would prefer and let us know.\par            .\par            .\par            ====\par            Feb 13, 2014\par            .\par            PCP: Dr. Milagro Younger\par             Neuro: Dr. Rosemary Lopez - shooting leg pain - resolved\par             Ortho Dr Koch for the knee pain - \par             Card: Dr. Nisha Tong - palpitations/FHx ASHD\par             Rheum: Dr. Karla Wong - knee pain - emeric Rx of arthritis\par             Surgery: Dr. Fredy Maher\par            .\par            Labs Dec 19 at [place] show TSH 0.32 (0.35-5.6)\par            Phos 4.8\par            calcium \par            TSI < 1.0\par            TPO ab neg.\par            .\par            FNAB of dominant isthmus thyroid nodule: "Suspicious for papillary carcinoma, scant evidence....The follicular cells are present in threee dimensional clusters of enlarged, overlapping cells with nuclear atypia including nuclear grooves and reare intranuclear pseudoinclusions....."\par            .\par            Impression: Surgical excision is appropriate for definitive diagnosis and she has made arrangements for such by the end of the month.\par            I encouraged her to return here about one month later. \par            .\par            ==\par            Dec 19, 2014\par            PCP: Dr. Milagro Younger\par             Neuro: Dr. Rosemary Lopez\par             Card: Dr. Nisha Tong\par             Rheum: Dr. 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 A formal thyroid ultrasound read by Dr. Umesh Fuller from Oct 1 showed R thyroid lobe 3.8 cm. The left thyroid lobe could not be imaged. "The thyroid gland is apparently nearly replaced by cysts and hypoechoic lesions.....clinical correlation is advised...."\par            .\par            Lab tests showed normal TSH of 0.4 .\par            .\par            Plan: Updated labs and sonogram thyroid at [place].\par            ROV after that.\par            .\par            .\par            ==\par            .

## 2020-10-16 ENCOUNTER — NON-APPOINTMENT (OUTPATIENT)
Age: 65
End: 2020-10-16

## 2020-10-16 ENCOUNTER — APPOINTMENT (OUTPATIENT)
Dept: CARDIOLOGY | Facility: CLINIC | Age: 65
End: 2020-10-16
Payer: MEDICARE

## 2020-10-16 VITALS
BODY MASS INDEX: 27.74 KG/M2 | SYSTOLIC BLOOD PRESSURE: 100 MMHG | DIASTOLIC BLOOD PRESSURE: 78 MMHG | HEIGHT: 68 IN | WEIGHT: 183 LBS

## 2020-10-16 DIAGNOSIS — E53.8 DEFICIENCY OF OTHER SPECIFIED B GROUP VITAMINS: ICD-10-CM

## 2020-10-16 DIAGNOSIS — Z82.49 FAMILY HISTORY OF ISCHEMIC HEART DISEASE AND OTHER DISEASES OF THE CIRCULATORY SYSTEM: ICD-10-CM

## 2020-10-16 PROCEDURE — 99214 OFFICE O/P EST MOD 30 MIN: CPT

## 2020-10-16 PROCEDURE — 93000 ELECTROCARDIOGRAM COMPLETE: CPT

## 2020-10-16 NOTE — HISTORY OF PRESENT ILLNESS
[FreeTextEntry1] : Ms Izquierdo has been followed here since  2002 for palpitations. She was found to have symptomatic sinus tachycardia and VPCs.\par Since last visit she has not been hospitalized, she has developed a frozen shoulder.\par  She denies chest pain, dyspnea, palpitations or syncope.\par She is walking daily, but has gained weight.\par

## 2020-10-16 NOTE — REASON FOR VISIT
[Follow-Up - Clinic] : a clinic follow-up of [Chest Pain] : chest pain [Hyperlipidemia] : hyperlipidemia [FreeTextEntry2] : 1 year [FreeTextEntry1] : kinjal

## 2020-10-21 RX ORDER — BISOPROLOL FUMARATE 5 MG/1
TABLET, FILM COATED ORAL DAILY
Refills: 0 | Status: DISCONTINUED | COMMUNITY
End: 2020-10-21

## 2021-10-19 ENCOUNTER — NON-APPOINTMENT (OUTPATIENT)
Age: 66
End: 2021-10-19

## 2021-10-20 ENCOUNTER — APPOINTMENT (OUTPATIENT)
Dept: CARDIOLOGY | Facility: CLINIC | Age: 66
End: 2021-10-20
Payer: MEDICARE

## 2021-10-20 VITALS
TEMPERATURE: 98.6 F | HEART RATE: 87 BPM | HEIGHT: 68 IN | SYSTOLIC BLOOD PRESSURE: 110 MMHG | DIASTOLIC BLOOD PRESSURE: 84 MMHG | WEIGHT: 186.5 LBS | OXYGEN SATURATION: 97 % | BODY MASS INDEX: 28.26 KG/M2

## 2021-10-20 PROCEDURE — 36415 COLL VENOUS BLD VENIPUNCTURE: CPT

## 2021-10-20 PROCEDURE — 93000 ELECTROCARDIOGRAM COMPLETE: CPT

## 2021-10-20 PROCEDURE — 99215 OFFICE O/P EST HI 40 MIN: CPT

## 2021-10-20 RX ORDER — STANDARDIZED SENNA CONCENTRATE 8.6 MG/1
TABLET ORAL
Refills: 0 | Status: COMPLETED | COMMUNITY
End: 2021-10-20

## 2021-10-20 RX ORDER — TRAMADOL HYDROCHLORIDE 50 MG/1
TABLET, COATED ORAL
Refills: 0 | Status: COMPLETED | COMMUNITY
End: 2021-10-20

## 2021-10-20 NOTE — HISTORY OF PRESENT ILLNESS
[FreeTextEntry1] : Ms Izquierdo has been followed here since  2002 for palpitations. She was found to have symptomatic sinus tachycardia and VPCs.\par \par There have been no hospitalizations since last visit.\par She denies chest pain, dyspnea, palpitations or syncope.\par She walks 21/2 miles a day.\par \par She did not see any doctors in the past year. She did not have the CTA. Her  has amyloid CMP. She has moved to [place], also has a condo here.\par \par

## 2021-12-27 ENCOUNTER — RX RENEWAL (OUTPATIENT)
Age: 66
End: 2021-12-27

## 2022-03-08 ENCOUNTER — RESULT REVIEW (OUTPATIENT)
Age: 67
End: 2022-03-08

## 2022-03-15 ENCOUNTER — APPOINTMENT (OUTPATIENT)
Dept: ENDOCRINOLOGY | Facility: CLINIC | Age: 67
End: 2022-03-15
Payer: MEDICARE

## 2022-03-15 ENCOUNTER — NON-APPOINTMENT (OUTPATIENT)
Age: 67
End: 2022-03-15

## 2022-03-15 VITALS
HEIGHT: 68 IN | OXYGEN SATURATION: 98 % | BODY MASS INDEX: 29.25 KG/M2 | DIASTOLIC BLOOD PRESSURE: 78 MMHG | SYSTOLIC BLOOD PRESSURE: 125 MMHG | HEART RATE: 68 BPM | WEIGHT: 193 LBS

## 2022-03-15 DIAGNOSIS — C73 MALIGNANT NEOPLASM OF THYROID GLAND: ICD-10-CM

## 2022-03-15 PROCEDURE — 99212 OFFICE O/P EST SF 10 MIN: CPT

## 2022-03-18 NOTE — HISTORY OF PRESENT ILLNESS
[FreeTextEntry1] : Mar 15, 2022    in person\par \par PCP: Dr. Milagro Younger\par             Neuro: Dr. Rosemary Lopez - shooting leg pain - resolved\par             Ortho Dr Koch for the knee pain -     better on gluten free diet\par             Card: Dr. Nisha Tong - palpitations/FHx ASHD\par             Rheum: Dr. Karla Wong - knee pain - emperic Rx of arthritis\par             Surgery: Dr. Fredy Maher\par             ENT: to see for nasal polyp\par             Urol:  Dr. Epps ([facility]) - Stevia bad.\par             Gyn:  Dr. Nimo Marcos\par \par            .\par            CC: Thyroid cancer, R lobe ~1 cm - 2/23/15 - Dr. Maher \par             " Encapsulated papillary thyroid carcinoma, classical type with oncocytic features, non-invasive (1 cm in greatest size per outside report). 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.\par             (Hx swollen knees, tapped by rheum - apparently non-diagnostic,\par             +FHx RA w/ low level RF) \par \par Retired from [place], then per meghan for a while.\par  being treated at [facility] for amyloidosis.\par \par : :\par Constitutional:  Alert, well nourished, healthy appearance, no acute distress \par Eyes:  No proptosis, no stare\par Thyroid:\par Pulmonary:  No respiratory distress, no accessory muscle use; normal rate and effort\par Cardiac:  Normal rate\par Vascular: \par Endocrine:  No stigmata of Cushing’s Syndrome\par Musculoskeletal:  Normal gait, no involuntary movements\par Neurology:  No tremors\par Affect/Mood/Psych:  Oriented x 3; normal affect, normal insight/judgement, normal mood \par .\par  \par Imp/Plan: Multifocal micropapillary thyroid cancer.   Recent [facility] TSH 1.53\par 3/8/22 US thyroid - stable and reassuring.  \par Under a lot of stress related to 's health.\par \par Plan:  Updated labs before next visit and US neck when she can arrange.  \par ROV ~ one year.  -jh\par \par \par \par Oct 07, 2020    in person\par \par PCP: Dr. Milagro Younger\par             Neuro: Dr. Rosemary Lopez - shooting leg pain - resolved\par             Ortho Dr Koch for the knee pain -     better on gluten free diet\par             Card: Dr. Nisha Tong - palpitations/FHx ASHD\par             Rheum: Dr. Karla Wong - knee pain - emperic Rx of arthritis\par             Surgery: Dr. Fredy Maher\par             ENT: to see for nasal polyp\par             Urol:  Dr. Epps ([facility]) - Stevia bad.\par             Gyn:  Dr. Nimo Marcos\par \par            .\par            CC: Thyroid cancer, R lobe ~1 cm - 2/23/15 - Dr. Maher \par             " Encapsulated papillary thyroid carcinoma, classical type with oncocytic features, non-invasive (1 cm in greatest size per outside report). 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[place].\par \par Examination: \par Constitutional:  Alert, well nourished, healthy appearance, no acute distress \par Eyes:  No proptosis, no lid lag\par Thyroid:\par Pulmonary:  No respiratory distress, no accessory muscle use; normal rate and effort\par Cardiac:  Normal rate\par Vascular: \par Back:  Spine straight\par Endocrine:  No stigmata of Cushing’s Syndrome\par Musculoskeletal:  Normal gait, no involuntary movements\par Neurology:  No tremors\par Affect/Mood/Psych:  Oriented x 3; normal affect, normal insight/judgement, normal mood \par .\par  \par Impression:  Recent US thyroid at [facility] reassuring.   Advised next US at [facility].\par Tg at [facility] 8.3.  TSH 1.55  Sept 2019\par \par \par \par Oct 02, 2019\par \par PCP: Dr. Milagro Younger\par             Neuro: Dr. Rosemary Lopez - shooting leg pain - resolved\par             Ortho Dr Koch for the knee pain -     better on gluten free diet\par             Card: Dr. Nisha Tong - palpitations/FHx ASHD\par             Rheum: Dr. Karla Wong - knee pain - emperic Rx of arthritis\par             Surgery: Dr. Fredy Maher\par             ENT: to see for nasal polyp\par             Urol:  Dr. Epps ([facility]) - Stevia bad.\par             Gyn:  Dr. Nimo Marcos\par \par            .\par            CC: Thyroid cancer, R lobe ~1 cm - 2/23/15 - Dr. Maher \par             " Encapsulated papillary thyroid carcinoma, classical type with oncocytic features, non-invasive (1 cm in greatest size per outside report). 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  Left lobe 5.2 cm with multiple nodules and an "apparent increase in size of 2 of these nodules.  Follow up study in 6 months time to ensure stability is recommended.  If any increase of these nodules is appreciated, aspiration biopsy to establish histologic diagnosis would be necessary..." as read by Dr. Mauricio Hammer.\par \par Plan:  [facility] and Ultrasound thyroid at [facility] in late March and ROV here in April.  \par \par Plan:  Updated US and labs in 6 months as advised by radiology.\par ROV after that.  \par \par \par \par \par \par \par March 19, 2019..........................ba\par \par PCP: Dr. Milagro Younger\par             Neuro: Dr. Rosemary Lopez - shooting leg pain - resolved\par             Ortho Dr Koch for the knee pain - \par             better on gluten free diet\par             Card: Dr. Nisha Tong - palpitations/FHx ASHD\par             Rheum: Dr. Karla Wong - knee pain - emperic Rx of arthritis\par             Surgery: Dr. Fredy Maher\par             ENT: to see for nasal polyp\par            .\par            CC: Thyroid cancer, R lobe ~1 cm - 2/23/15 - Dr. Maher \par             " Encapsulated papillary thyroid carcinoma, classical type with oncocytic features, non-invasive (1 cm in greatest size per outside report). 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.\par             (Hx swollen knees, tapped by rheum - apparently non-diagnostic,\par             +FHx RA w/ low level RF) \par \par 64 yo working part time at "[employer]     Has friend at [facility].\par Went to [facility] for thyroglobulin, but Tg Ab and TPO ab performed.  \par US says new nodules and repeat.\par Will repeat US end of August-early Sept and ROV after that.    and before next visit at lab she previously visited.  \par \par Previous notes from [employer] appended below.\par \par \par  March 26, 2018\par            .\par            PCP: Dr. Milagro Younger\par             Neuro: Dr. Rosemary Lopez - shooting leg pain - resolved\par             Ortho Dr Koch for the knee pain - \par             better on gluten free diet\par             Card: Dr. Nisha Tong - palpitations/FHx ASHD\par             Rheum: Dr. Karla Wong - knee pain - emperic Rx of arthritis\par             Surgery: Dr. Fredy Maher\par             ENT: to see for nasal polyp\par            .\par            CC: Thyroid cancer, R lobe ~1 cm - 2/23/15 - Dr. Maher \par             " Encapsulated papillary thyroid carcinoma, classical type with oncocytic features, non-invasive (1 cm in greatest size per outside report). 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, found to have thyroid nodule during carotid ultrasound - currently on on wheat free diet per her\par            brother (GP) - with success.\par            .\par            Last year in February had ultrasound of neck.\par            .\par            At [facility], on \par            3/28/2017 labs included]\par            TPO ab neg\par            Tg 9.2 *\par            TSH 1.46\par            \par            HDL 62 \par            .\par            [id number]\par            Tg 10.9\par            Tg Ab neg \par            TSH 1.16 \par            .\par            Updated ultrasound of thyroid at [facility]:\par            .\par            "Exam Date: 03/14/18 \par            Exam: US THYROID \par            Order#: [id number] \par            CLINICAL HISTORY: Papillary carcinoma. Follow-up. \par             Thyroid Ultrasound \par             Real-time sonographic examination was performed on 3/14/2018 and compared to a previous study \par            dated 2/16/2017. Patient is status post right thyroidectomy. A 0.8 x 0.4 x 0.6 cm echogenic nodule \par            is again appreciated in the right thyroid fossa and is grossly unchanged. The isthmus measures 0.3 \par            cm in AP dimension. The left thyroid lobe measures 4.9 x 1.7 x 1.5 cm. It contains multiple cysts \par            as well as several nodules, the most prominent of which are as follows: \par             0.8 x 0.6 x 0.9 cm hypoechoic nodule upper pole-grossly unchanged \par             0.6 x 0.5 x 0.8 cm heterogeneous nodule mid gland-grossly unchanged \par             0.9 x 0.6 x 0.7 cm hypoechoic nodule inferior pole-grossly unchanged \par             IMPRESSION: Status post right thyroidectomy. No significant change in small echogenic nodule \par            within the right thyroid fossa. \par             No gross change in several left thyroid nodules. \par            ***Electronically Signed *** \par            ----------------------------------------------- \par            Mauricio Hammer MD " \par            .\par            Impression: She is very good about following up for lab tests, ultrasounds, physician visits. Continued surveillance by means of exam, history, labs and ultrasound is certainly reasonble for now. She prefers annual interval.\par            .\par            Plan: Updated ultrasound neck at [facility] and blood tests at [facility] including thyroglobulin, TSH before next visit early 2019. She will go for updated bone density at [facility] as well. \par            .\par            March 15, 2017\par            .\par            PCP: Dr. Milagro Younger\par             Neuro: Dr. Rosemary Lopez - shooting leg pain - resolved\par             Ortho Dr Koch for the knee pain - \par             better on gluten free diet\par             Card: Dr. Nisha Tong - palpitations/FHx ASHD\par             Rheum: Dr. Karla Wong - knee pain - emperic Rx of arthritis\par             Surgery: Dr. Fredy Maher\par             ENT: to see for nasal polyp\par            .\par            CC: Thyroid cancer, R lobe ~1 cm - 2/23/15 - Dr. Maher \par             " Encapsulated papillary thyroid carcinoma, classical type with oncocytic features, non-invasive (1 cm in greatest size per outside report). 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 Younger on\par            3/7/2017 included:\par            TSH 1.46 (on no LT4 Rx)\par            free T4 1.1 (0.8-1.9) \par            .\par            Most recent ultrasound of thyroid at [facility]:.\par            .\par            "Patient Name: MISTI SARGENT Location: [facility] \par            Med Rec #: [id number] Account #: [id number] \par            YOB: 1955 Ordering: Hellerman, James MD \par            Age: 61 Sex: F Attending: Fredy Maher MD \par            PCP: NO PRIMARY CARE PHYSICIAN \par            ______________________________________________________________________________________ \par            Exam Date: 02/16/17 \par            Exam: US THYROID \par            Order#: [id number] \par            HISTORY: Status post right thyroidectomy for papillary carcinoma, follow-up \par             Real-time examination was performed. Comparison is made to prior sonogram of 8/18/2016. \par             The patient is status post right thyroid lobectomy. There has been no gross change in a solid \par            nodule in the right thyroid bed that measures 8 x 3.9 x 5.6 mm. \par             The left thyroid lobe measures 4.9 x 1.7 x 1.5 cm. There has been no change in diffuse \par            heterogeneity of left lobe parenchymal echogenicity. There has been no change in a 9 x 6.1 x 8.2 mm \par            predominantly solid nodule in the left upper pole or in a 7.4 x 4 x 6 mm predominantly solid nodule \par            in the anterior mid pole or in several predominantly cystic lesions in the left mid and lower pole. \par             IMPRESSION: No significant change from prior sonogram of 8/18/2016, as outlined above - read by Dr. Kent\par            "\par            Impression: History of thyroid cancer ("NIFTP") s/p hemithyroidectomy, remains under surveillance by means of TFTs, ultrasound and will also update her thyroglobulin level.\par            .\par            ROV 8 months. Thank you. \par            .\par            ==\par            .\par            August 23, 2016\par            .\par            PCP: Dr. Milagro Younger\par             Neuro: Dr. Rosemary Lopez - shooting leg pain - resolved\par             Ortho Dr Koch for the knee pain - \par             better on gluten free diet\par             Card: Dr. Nisha Tong - palpitations/FHx ASHD\par             Rheum: Dr. Karla Wong - knee pain - emperic Rx of arthritis\par             Surgery: Dr. Fredy Maher\par             ENT: to see for nasal polyp\par            .\par            CC: Thyroid cancer, R lobe ~1 cm - 2/23/15 - Dr. Maher \par            .\par            Recent labs ([facility])\par            8/16/2016 included\par            thyroglobulin 8.9 \par            thyroglobulin antibodies: neg\par            TSH 1.73 (w/o Rx) \par            .\par            Ultrasound of thyroid at [facility] on\par            8/18/2016 as read by Dr. Bryant:\par            .\par            "CLINICAL HISTORY: Status post right thyroidectomy for papillary carcinoma\par            COMPARISON: 12/26/2014, 1/16/2015, and 8/13/2015\par            FINDINGS: As compared to most recent previous study the patient again is status post right thyroidectomy. However at this time a hypoechoic residual is seen in the right thyroid fossa measuring 7.5 x 5.8 x 3.4 mm. This may represent residual or recurrent abnormality.\par            The left lobe of the thyroid measures 5.1 x 1.8 x 2 cm and is diffusely heterogeneous and nodular including smaller cystic areas. A spongiform nodule is seen in the upper pole measuring 8.6 x 8.1 x 6 mm. A midpole anterior spongiform nodule measures 6 x 4.3 x 7.4 mm. These were present previously. The remaining abnormalities are consistent with multifocal colloid cysts.\par            Residual portion of the isthmus is seen to the left of midline measuring 1.5 mm.\par            IMPRESSION: Right thyroidectomy. A hypoechoic nodule is now seen in the thyroid bed that may represent residual or recurrent mass. Recommend further assessment.\par            No change in the left side with nodules and cysts again apparent."\par            .\par            Impression: Doing well. \par            .\par            Plan: Continued surveillance.\par            Updated labs and ultrasound in six months and ROV after that. \par            Thank you. -\par            .\par            .==\par            .\par            February 16, 2016\par            .\par            PCP: Dr. Milagro Younger\par             Neuro: Dr. Rosemary Lopez - shooting leg pain - resolved\par             after taking Tramadol\par             Ortho Dr Koch for the knee pain - gave up wheat\par             celiac neg\par             Card: Dr. Nisha Tong - palpitations/FHx ASHD\par             Rheum: Dr. Karla Wong - knee pain - emperic Rx of arthritis\par             Surgery: Dr. Fredy Maher\par             ENT: to see for nasal polyp\par            .\par            CC: Thyroid cancer, R lobe ~1 cm - 2/23/15 - Dr. Maher \par            .\par            Dr. Younger did blood tests 12/9/15\par            TSH 1.4\par            .\par            Sono August 2015 OK..\par            .\par            Plan: ROV late August. \par            .\par            .\par            .\par            ==\par            .\par            August 14, 2015\par            .\par            PCP: Dr. Milagro Younger\par             Neuro: Dr. Rosemary Lopez - shooting leg pain - resolved\par             Ortho Dr Koch for the knee pain - \par             Card: Dr. Nisha Tong - palpitations/FHx ASHD\par             Rheum: Dr. Karla Wong - knee pain - emperic Rx of arthritis\par             Surgery: Dr. Fredy Maher\par            .\par            CC: Thyroid cancer, R lobe ~1 cm - 2/23/15 - Dr. Maher \par            .\par            Note from April visit appended below.\par            Labs done at that time included\par            Tg Ab negative\par            thyroglobulin 14\par            T4 6.4\par            TPO ab neg\par            TSH 1.43.\par            .\par            Impression: Feeling well. Sono thyroid yesterday showed no nodules in remaining lobe. Patient realizes that this does NOT exclude the possibility of micro carcinoma in the remaining lobe. However, she prefers continued surveillance.\par            .\par            Plan: Updated lab; f/u ultrasound one year. -\par            .\par            ==\par            .\par            .\par            ==\par            .\par            April 3, 2015\par            .\par            .\par            Note from February appended below.\par            .\par            Dec 2014 ultrasound of thyroid at [facility]:\par            .\par            "CLINICAL HISTORY: Thyroid nodule\par            FINDINGS: Thyroid isthmus measures 2.8 mm. Midline slightly to the left contains a hypoechoic nodule relatively defined and measuring 1.4 x 1.1 x 0.8 cm. A similar nodule to the right of midline measures 0.85 x 0.81 x 0.63 cm.\par            Right lobe of the thyroid diffusely heterogeneous and cystic and nodular measures 4.9 x 1.5 x 1.7 cm. The largest somewhat spongiform-appearing nodule seen at the lower pole measures 1.2 x 0.8 x 0.6 cm in at the upper pole measures 1.1 x 1.1 x 0.54 cm.\par            The left lobe of the thyroid measures 5 x 1.4 x 1.3 cm diffusely heterogeneous nodular and cystic. The largest nodule at the midpole measures 0.87 x 0.83 x 0.48 cm.\par            IMPRESSION: Multifocal nodular and cystic heterogeneous thyroid gland. Largest nodule is seen in the isthmus central slightly to the left. A comparison to prior studies is suggested. If not available sonogram guided needle aspiration biopsy of the dominant isthmus mass is suggested. Additional one-year interval follow-up of the remaining findings is suggested.\par            .\par            .\par            Jan 16, 2015: FNAB at [facility]: "suspicious for papillary thyroid cancer."\par            ..\par            .\par            .\par            Feb 23, 2015 underwent R lobe and thyroid isthmus excision at [facility] by Dr. Fredy Maher.\par            Pathology reviewed at [facility] by [place]. Nolan: "The FNA is positive for papillary carcinoma. The slides show a background of nodular hyperplasia. Slide A5 shows an encapsulated follicular and papillary proliferation lined by cells having enlarged, irregular, overlapping nuclei with variable degree of nuclear clearing and rare grooves. The lesion is non-invasive. I believe we are dealing with a papillary carcinoma. The mild nuclear clearing may be related to the oncocyteic nature of the lesional cells cytoplasm. I agree with you that there is a papillary microcarcinoma in slideA7. There is no vascular invasion by this microcarcinoma. In conclusion, by diagnosis is: 1 Encapsulated papillary thyroid carcinoma, classical type with oncocytic features, non-invasive (1 cm in greatest size per outside report). Papillary throid microcarcinoma growing as a follicular variant, infiltrative (< 0.1 cm in greateds size)."\par            .\par            .\par            Impression: Options available include completion left thyroid lobectomy versus regular monitoring. The optimal course is not clear and will need to take into account patient preferences. As long as she is willing to undergo close surveillance by means of exam and ultrasound and possible biopsy, then surveillance is a reasonable option - as is surgery.\par            .\par            Plan: She will think about what she would prefer and let us know.\par            .\par            .\par            ====\par            Feb 13, 2014\par            .\par            PCP: Dr. Milagro Younger\par             Neuro: Dr. Rosemary Lopez - shooting leg pain - resolved\par             Ortho Dr Koch for the knee pain - \par             Card: Dr. Nisha Tong - palpitations/FHx ASHD\par             Rheum: Dr. Karla Wong - knee pain - emeric Rx of arthritis\par             Surgery: Dr. Fredy Maher\par            .\par            Labs Dec 19 at [facility] show TSH 0.32 (0.35-5.6)\par            Phos 4.8\par            calcium \par            TSI < 1.0\par            TPO ab neg.\par            .\par            FNAB of dominant isthmus thyroid nodule: "Suspicious for papillary carcinoma, scant evidence....The follicular cells are present in threee dimensional clusters of enlarged, overlapping cells with nuclear atypia including nuclear grooves and reare intranuclear pseudoinclusions....."\par            .\par            Impression: Surgical excision is appropriate for definitive diagnosis and she has made arrangements for such by the end of the month.\par            I encouraged her to return here about one month later. \par            .\par            ==\par            Dec 19, 2014\par            PCP: Dr. Milagro Younger\par             Neuro: Dr. Rosemary Lopez\par             Card: Dr. Nisha Tong\par             Rheum: Dr. 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 A formal thyroid ultrasound read by Dr. Umesh Fuller from Oct 1 showed R thyroid lobe 3.8 cm. The left thyroid lobe could not be imaged. "The thyroid gland is apparently nearly replaced by cysts and hypoechoic lesions.....clinical correlation is advised...."\par            .\par            Lab tests showed normal TSH of 0.4 .\par            .\par            Plan: Updated labs and sonogram thyroid at [facility].\par            ROV after that.\par            .\par            .\par            ==\par            .

## 2022-03-25 ENCOUNTER — RX RENEWAL (OUTPATIENT)
Age: 67
End: 2022-03-25

## 2022-10-25 ENCOUNTER — NON-APPOINTMENT (OUTPATIENT)
Age: 67
End: 2022-10-25

## 2022-10-26 ENCOUNTER — NON-APPOINTMENT (OUTPATIENT)
Age: 67
End: 2022-10-26

## 2022-10-26 ENCOUNTER — APPOINTMENT (OUTPATIENT)
Dept: CARDIOLOGY | Facility: CLINIC | Age: 67
End: 2022-10-26

## 2022-10-26 VITALS
OXYGEN SATURATION: 96 % | HEART RATE: 76 BPM | WEIGHT: 163 LBS | SYSTOLIC BLOOD PRESSURE: 120 MMHG | BODY MASS INDEX: 24.71 KG/M2 | HEIGHT: 68 IN | DIASTOLIC BLOOD PRESSURE: 80 MMHG

## 2022-10-26 PROCEDURE — G0008: CPT

## 2022-10-26 PROCEDURE — 90662 IIV NO PRSV INCREASED AG IM: CPT

## 2022-10-26 PROCEDURE — 99214 OFFICE O/P EST MOD 30 MIN: CPT

## 2022-10-26 PROCEDURE — 93000 ELECTROCARDIOGRAM COMPLETE: CPT

## 2022-10-26 RX ORDER — CAMPHOR 0.45 %
25 GEL (GRAM) TOPICAL
Qty: 30 | Refills: 3 | Status: ACTIVE | COMMUNITY
Start: 2022-10-26

## 2022-10-26 NOTE — HISTORY OF PRESENT ILLNESS
[FreeTextEntry1] : Ms Izquierdo has been followed here since  2002 for palpitations. She was found to have symptomatic sinus tachycardia and VPCs.\par \par There have been no hospitalizations since last visit. She had 3 UTIs this year.\par \par She denies chest pain, dyspnea, palpitations, she had a syncopal episode this year when she was drinking only shakes.\par \par Her  has amyloid CMP. \par \par

## 2022-11-14 ENCOUNTER — RESULT REVIEW (OUTPATIENT)
Age: 67
End: 2022-11-14

## 2022-11-15 RX ORDER — SIMVASTATIN 40 MG/1
40 TABLET, FILM COATED ORAL
Refills: 0 | Status: DISCONTINUED | COMMUNITY
Start: 2022-06-13 | End: 2022-11-15

## 2022-12-02 ENCOUNTER — APPOINTMENT (OUTPATIENT)
Dept: CARDIOLOGY | Facility: CLINIC | Age: 67
End: 2022-12-02

## 2022-12-21 ENCOUNTER — APPOINTMENT (OUTPATIENT)
Dept: CARDIOLOGY | Facility: CLINIC | Age: 67
End: 2022-12-21
Payer: MEDICARE

## 2022-12-21 PROCEDURE — 93306 TTE W/DOPPLER COMPLETE: CPT

## 2022-12-22 LAB
ALBUMIN SERPL ELPH-MCNC: 4.8 G/DL
ALP BLD-CCNC: 94 U/L
ALT SERPL-CCNC: 22 U/L
ANION GAP SERPL CALC-SCNC: 17 MMOL/L
AST SERPL-CCNC: 29 U/L
BILIRUB SERPL-MCNC: 1.5 MG/DL
BUN SERPL-MCNC: 13 MG/DL
CALCIUM SERPL-MCNC: 10.2 MG/DL
CHLORIDE SERPL-SCNC: 105 MMOL/L
CHOLEST SERPL-MCNC: 189 MG/DL
CO2 SERPL-SCNC: 21 MMOL/L
CREAT SERPL-MCNC: 0.87 MG/DL
EGFR: 73 ML/MIN/1.73M2
GLUCOSE SERPL-MCNC: 86 MG/DL
HDLC SERPL-MCNC: 67 MG/DL
LDLC SERPL CALC-MCNC: 97 MG/DL
NONHDLC SERPL-MCNC: 122 MG/DL
POTASSIUM SERPL-SCNC: 4.1 MMOL/L
PROT SERPL-MCNC: 7.6 G/DL
SODIUM SERPL-SCNC: 142 MMOL/L
TRIGL SERPL-MCNC: 128 MG/DL

## 2022-12-22 RX ORDER — SIMVASTATIN 10 MG/1
10 TABLET, FILM COATED ORAL
Qty: 90 | Refills: 3 | Status: DISCONTINUED | COMMUNITY
Start: 1900-01-01 | End: 2022-12-22

## 2022-12-23 LAB — APO LP(A) SERPL-MCNC: <9 NMOL/L

## 2022-12-27 ENCOUNTER — RX RENEWAL (OUTPATIENT)
Age: 67
End: 2022-12-27

## 2023-01-10 ENCOUNTER — APPOINTMENT (OUTPATIENT)
Dept: CARDIOLOGY | Facility: CLINIC | Age: 68
End: 2023-01-10

## 2023-02-15 ENCOUNTER — RX RENEWAL (OUTPATIENT)
Age: 68
End: 2023-02-15

## 2023-03-27 ENCOUNTER — RX RENEWAL (OUTPATIENT)
Age: 68
End: 2023-03-27

## 2023-09-13 ENCOUNTER — RX RENEWAL (OUTPATIENT)
Age: 68
End: 2023-09-13

## 2023-10-29 ENCOUNTER — NON-APPOINTMENT (OUTPATIENT)
Age: 68
End: 2023-10-29

## 2023-10-29 DIAGNOSIS — M25.569 PAIN IN UNSPECIFIED KNEE: ICD-10-CM

## 2023-10-29 DIAGNOSIS — E55.9 VITAMIN D DEFICIENCY, UNSPECIFIED: ICD-10-CM

## 2023-10-29 DIAGNOSIS — F51.01 PRIMARY INSOMNIA: ICD-10-CM

## 2023-11-01 ENCOUNTER — LABORATORY RESULT (OUTPATIENT)
Age: 68
End: 2023-11-01

## 2023-11-01 ENCOUNTER — NON-APPOINTMENT (OUTPATIENT)
Age: 68
End: 2023-11-01

## 2023-11-01 ENCOUNTER — APPOINTMENT (OUTPATIENT)
Dept: CARDIOLOGY | Facility: CLINIC | Age: 68
End: 2023-11-01
Payer: MEDICARE

## 2023-11-01 VITALS
DIASTOLIC BLOOD PRESSURE: 88 MMHG | BODY MASS INDEX: 27.37 KG/M2 | OXYGEN SATURATION: 97 % | WEIGHT: 180 LBS | SYSTOLIC BLOOD PRESSURE: 130 MMHG | HEART RATE: 76 BPM

## 2023-11-01 DIAGNOSIS — E78.01 FAMILIAL HYPERCHOLESTEROLEMIA: ICD-10-CM

## 2023-11-01 DIAGNOSIS — I49.3 VENTRICULAR PREMATURE DEPOLARIZATION: ICD-10-CM

## 2023-11-01 DIAGNOSIS — R00.2 PALPITATIONS: ICD-10-CM

## 2023-11-01 DIAGNOSIS — Z23 ENCOUNTER FOR IMMUNIZATION: ICD-10-CM

## 2023-11-01 DIAGNOSIS — R93.1 ABNORMAL FINDINGS ON DIAGNOSTIC IMAGING OF HEART AND CORONARY CIRCULATION: ICD-10-CM

## 2023-11-01 PROCEDURE — 93000 ELECTROCARDIOGRAM COMPLETE: CPT

## 2023-11-01 PROCEDURE — 90662 IIV NO PRSV INCREASED AG IM: CPT

## 2023-11-01 PROCEDURE — 99214 OFFICE O/P EST MOD 30 MIN: CPT | Mod: 25

## 2023-11-01 PROCEDURE — G0008: CPT

## 2023-11-01 PROCEDURE — 36415 COLL VENOUS BLD VENIPUNCTURE: CPT

## 2023-11-01 RX ORDER — CLOBETASOL PROPIONATE 0.5 MG/G
0.05 OINTMENT TOPICAL
Qty: 60 | Refills: 0 | Status: ACTIVE | COMMUNITY
Start: 2023-05-22

## 2023-11-02 LAB
ALBUMIN SERPL ELPH-MCNC: 4.6 G/DL
ALP BLD-CCNC: 97 U/L
ALT SERPL-CCNC: 22 U/L
ANION GAP SERPL CALC-SCNC: 12 MMOL/L
AST SERPL-CCNC: 29 U/L
BILIRUB SERPL-MCNC: 1 MG/DL
BUN SERPL-MCNC: 12 MG/DL
CALCIUM SERPL-MCNC: 9.8 MG/DL
CHLORIDE SERPL-SCNC: 104 MMOL/L
CHOLEST SERPL-MCNC: 163 MG/DL
CO2 SERPL-SCNC: 26 MMOL/L
CREAT SERPL-MCNC: 0.83 MG/DL
EGFR: 76 ML/MIN/1.73M2
GLUCOSE SERPL-MCNC: 73 MG/DL
HDLC SERPL-MCNC: 55 MG/DL
LDLC SERPL CALC-MCNC: 79 MG/DL
NONHDLC SERPL-MCNC: 108 MG/DL
POTASSIUM SERPL-SCNC: 4.2 MMOL/L
PROT SERPL-MCNC: 6.9 G/DL
SODIUM SERPL-SCNC: 142 MMOL/L
TRIGL SERPL-MCNC: 170 MG/DL

## 2023-12-12 ENCOUNTER — RX RENEWAL (OUTPATIENT)
Age: 68
End: 2023-12-12

## 2023-12-12 RX ORDER — BISOPROLOL FUMARATE 5 MG/1
5 TABLET, FILM COATED ORAL DAILY
Qty: 90 | Refills: 3 | Status: ACTIVE | COMMUNITY
Start: 2020-10-21 | End: 1900-01-01

## 2023-12-12 RX ORDER — ROSUVASTATIN CALCIUM 40 MG/1
40 TABLET, FILM COATED ORAL
Qty: 90 | Refills: 3 | Status: ACTIVE | COMMUNITY
Start: 2022-11-15 | End: 1900-01-01

## 2024-01-24 ENCOUNTER — APPOINTMENT (OUTPATIENT)
Dept: CARDIOLOGY | Facility: CLINIC | Age: 69
End: 2024-01-24
Payer: MEDICARE

## 2024-01-24 PROCEDURE — ZZZZZ: CPT | Mod: NC

## 2024-01-24 PROCEDURE — 93351 STRESS TTE COMPLETE: CPT

## 2024-11-04 ENCOUNTER — APPOINTMENT (OUTPATIENT)
Dept: CARDIOLOGY | Facility: CLINIC | Age: 69
End: 2024-11-04

## 2024-12-05 ENCOUNTER — RX RENEWAL (OUTPATIENT)
Age: 69
End: 2024-12-05

## 2024-12-06 ENCOUNTER — RX RENEWAL (OUTPATIENT)
Age: 69
End: 2024-12-06

## 2025-03-06 ENCOUNTER — RX RENEWAL (OUTPATIENT)
Age: 70
End: 2025-03-06

## 2025-06-06 ENCOUNTER — RX RENEWAL (OUTPATIENT)
Age: 70
End: 2025-06-06

## 2025-06-09 ENCOUNTER — RX RENEWAL (OUTPATIENT)
Age: 70
End: 2025-06-09

## 2025-06-12 ENCOUNTER — RX RENEWAL (OUTPATIENT)
Age: 70
End: 2025-06-12

## 2025-06-14 ENCOUNTER — NON-APPOINTMENT (OUTPATIENT)
Age: 70
End: 2025-06-14

## 2025-06-16 ENCOUNTER — APPOINTMENT (OUTPATIENT)
Dept: CARDIOLOGY | Facility: CLINIC | Age: 70
End: 2025-06-16
Payer: MEDICARE

## 2025-06-16 VITALS
WEIGHT: 185 LBS | DIASTOLIC BLOOD PRESSURE: 76 MMHG | HEART RATE: 71 BPM | HEIGHT: 68 IN | SYSTOLIC BLOOD PRESSURE: 118 MMHG | OXYGEN SATURATION: 96 % | BODY MASS INDEX: 28.04 KG/M2

## 2025-06-16 PROCEDURE — 99214 OFFICE O/P EST MOD 30 MIN: CPT

## 2025-06-16 PROCEDURE — 93000 ELECTROCARDIOGRAM COMPLETE: CPT

## 2025-06-17 LAB
ALBUMIN SERPL ELPH-MCNC: 4.5 G/DL
ALP BLD-CCNC: 115 U/L
ALT SERPL-CCNC: 27 U/L
ANION GAP SERPL CALC-SCNC: 15 MMOL/L
AST SERPL-CCNC: 29 U/L
BILIRUB SERPL-MCNC: 1.6 MG/DL
BUN SERPL-MCNC: 11 MG/DL
CALCIUM SERPL-MCNC: 9.8 MG/DL
CHLORIDE SERPL-SCNC: 104 MMOL/L
CHOLEST SERPL-MCNC: 133 MG/DL
CO2 SERPL-SCNC: 21 MMOL/L
CREAT SERPL-MCNC: 1.04 MG/DL
EGFRCR SERPLBLD CKD-EPI 2021: 57 ML/MIN/1.73M2
GLUCOSE SERPL-MCNC: 117 MG/DL
HCT VFR BLD CALC: 43.1 %
HDLC SERPL-MCNC: 51 MG/DL
HGB BLD-MCNC: 14.1 G/DL
LDLC SERPL-MCNC: 59 MG/DL
MCHC RBC-ENTMCNC: 27.5 PG
MCHC RBC-ENTMCNC: 32.7 G/DL
MCV RBC AUTO: 84 FL
NONHDLC SERPL-MCNC: 82 MG/DL
PLATELET # BLD AUTO: 290 K/UL
POTASSIUM SERPL-SCNC: 3.3 MMOL/L
PROT SERPL-MCNC: 6.7 G/DL
RBC # BLD: 5.13 M/UL
RBC # FLD: 12.9 %
SODIUM SERPL-SCNC: 140 MMOL/L
TRIGL SERPL-MCNC: 135 MG/DL
WBC # FLD AUTO: 7.06 K/UL

## 2025-06-17 RX ORDER — POTASSIUM CHLORIDE 750 MG/1
10 CAPSULE, EXTENDED RELEASE ORAL DAILY
Qty: 3 | Refills: 0 | Status: ACTIVE | COMMUNITY
Start: 2025-06-17 | End: 1900-01-01